# Patient Record
(demographics unavailable — no encounter records)

---

## 2024-10-22 NOTE — HISTORY OF PRESENT ILLNESS
[de-identified] : Ms. MARTINEZ is a pleasant 42 year old female who presents today for follow up of RIGHT ear pulsatile tinnitus.  It first started in 2012 after the birth of a child.  It is described as a constant, whooshing sound that is in sync with her pulse.  Pressing on the RIGHT side of her neck results in resolution of the pulsatile tinnitus.  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 At that time the symptoms were tolerable. Since that time it has been getting progressively worse. The pulsatile tinnitus is now debilitating and is causing significant impairment in activities if daily living.  She denies any headaches, blurry vision or diplopia.  She has not had a recent ophthalmology exam.

## 2024-10-22 NOTE — REASON FOR VISIT
[Follow-Up: _____] : a [unfilled] follow-up visit [Home] : at home, [unfilled] , at the time of the visit. [Medical Office: ([facility])___] : at the medical office located in  [Patient] : the patient [Self] : self

## 2024-10-22 NOTE — REASON FOR VISIT
[Follow-Up: _____] : a [unfilled] follow-up visit [Home] : at home, [unfilled] , at the time of the visit. [Medical Office: ([facility])___] : at the medical office located in  [Patient] : the patient [Self] : self

## 2024-10-22 NOTE — ASSESSMENT
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 This has increased since 2019.  She also has stenosis of the R transverse venous sinus. Denies headaches or visual complaints.   ASSESSMENT  1. Pulsatile Tinnitus  JULIANE MARTINEZ suffers from pulsatile tinnitus from venous origin. 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 We discussed the natural history of intracranial venous aneurysms and I explained to the patient that these aneurysms DO NOT cause intracranial hemorrhage or stroke.   The pulsatile tinnitus is severe and has a negative impact in social life, work and daily living. We discussed treatment options which include observation, trial of Diamox, endovascular treatment, open surgery.  The patient would like to proceed with endovascular treatment. Treatment includes embolization of the wide-neck venous aneurysm. We discussed with the patient my extensive personal experience with this treatment and the results of a recent clinical trial (Alicia et al, Interventional Neuroradiology 2020). We discussed the procedure that has two components. The first part consists of catheter angiogram and manometry to confirm the presence of the venous aneurysm. This part is typically performed with the patient awake. The second part consists of embolization of the venous aneurysm utilizing stent, coils or both and is performed under general anesthesia.  The risks, benefits and alternatives of the procedure were discussed with the patient in detail. In my personal experience, the risks are very rare, but the possibility is not zero. Risks include stroke, brain hemorrhage, any type of disability (facial or extremity weakness, facial or extremity numbness, speech difficulties, blindness) and death. There are also possible complications related to the incisions such as infection, pain, swelling and bleeding.  The patient is aware that the procedure requires dual antiplatelet therapy for 1-month post-stent (typically aspirin 325 mg daily and clopidogrel 75 mg daily) and antiplatelet monotherapy (typically aspirin 325 mg daily) for additional 11 months.  PLAN Schedule Embolization of large wide-neck aneurysm of the RIGHT sigmoid venous sinus